# Patient Record
Sex: FEMALE | Race: WHITE | NOT HISPANIC OR LATINO | Employment: FULL TIME | URBAN - METROPOLITAN AREA
[De-identification: names, ages, dates, MRNs, and addresses within clinical notes are randomized per-mention and may not be internally consistent; named-entity substitution may affect disease eponyms.]

---

## 2019-01-07 ENCOUNTER — HOSPITAL ENCOUNTER (OUTPATIENT)
Dept: OTHER | Facility: HOSPITAL | Age: 29
Discharge: HOME | End: 2019-01-07
Attending: FAMILY MEDICINE | Admitting: FAMILY MEDICINE

## 2019-06-21 ENCOUNTER — OFFICE VISIT (OUTPATIENT)
Dept: FAMILY MEDICINE CLINIC | Facility: CLINIC | Age: 29
End: 2019-06-21

## 2019-06-21 VITALS
TEMPERATURE: 98 F | RESPIRATION RATE: 18 BRPM | WEIGHT: 203 LBS | DIASTOLIC BLOOD PRESSURE: 72 MMHG | BODY MASS INDEX: 30.77 KG/M2 | OXYGEN SATURATION: 99 % | SYSTOLIC BLOOD PRESSURE: 116 MMHG | HEIGHT: 68 IN | HEART RATE: 90 BPM

## 2019-06-21 DIAGNOSIS — M25.531 WRIST PAIN, ACUTE, RIGHT: Primary | ICD-10-CM

## 2019-06-21 DIAGNOSIS — E66.3 OVERWEIGHT: Chronic | ICD-10-CM

## 2019-06-21 PROBLEM — F32.A DEPRESSION: Status: ACTIVE | Noted: 2019-06-21

## 2019-06-21 PROBLEM — F41.9 ANXIETY: Status: ACTIVE | Noted: 2019-06-21

## 2019-06-21 PROBLEM — F60.3 BORDERLINE PERSONALITY DISORDER (HCC): Status: ACTIVE | Noted: 2019-06-21

## 2019-06-21 PROBLEM — D64.9 ANEMIA: Status: ACTIVE | Noted: 2019-06-21

## 2019-06-21 PROCEDURE — 99213 OFFICE O/P EST LOW 20 MIN: CPT | Performed by: FAMILY MEDICINE

## 2019-06-21 RX ORDER — ELECTROLYTES/DEXTROSE
1 SOLUTION, ORAL ORAL DAILY
COMMUNITY

## 2019-06-21 RX ORDER — ESCITALOPRAM OXALATE 10 MG/1
10 TABLET ORAL DAILY
Refills: 2 | COMMUNITY
Start: 2019-05-21 | End: 2021-04-06

## 2019-06-21 NOTE — PROGRESS NOTES
Subjective   Su Dolan is a 29 y.o. female.     Patient is here today with a bump on her right wrist. Patient states that she fell on the wrist in May resulting in no major injuries, only soreness. Soreness has worsened since the injury. Patient has been wearing a support brace off and on for 2 weeks without relief.       Cyst   This is a new problem. The current episode started more than 1 month ago. The problem occurs constantly. The problem has been gradually worsening. Associated symptoms include joint swelling. Pertinent negatives include no anorexia, arthralgias, change in bowel habit, fatigue, myalgias, rash, sore throat, vomiting or weakness. The symptoms are aggravated by exertion. She has tried immobilization for the symptoms. The treatment provided no relief.   Obesity   This is a chronic problem. The current episode started more than 1 year ago. The problem occurs constantly. The problem has been gradually worsening. Associated symptoms include joint swelling. Pertinent negatives include no anorexia, arthralgias, change in bowel habit, fatigue, myalgias, rash, sore throat, vomiting or weakness. Exacerbated by: unable to loose weight since childbirth a year ago. Treatments tried: She reports trying exercise (walking), taking a busier job, dieting, [id number] calorie diet, and becoming a vegetarian. The treatment provided no relief.        The following portions of the patient's history were reviewed and updated as appropriate: allergies, current medications, past family history, past medical history, past social history, past surgical history and problem list.    Review of Systems   Constitutional: Negative for appetite change (denies stress eating or excessive appetite), fatigue and unexpected weight loss.   HENT: Negative for sore throat.    Eyes: Negative for visual disturbance.   Gastrointestinal: Negative for anorexia, change in bowel habit, constipation and vomiting.   Musculoskeletal:  "Positive for joint swelling. Negative for arthralgias, gait problem and myalgias.   Skin: Positive for skin lesions. Negative for rash and bruise.   Allergic/Immunologic: Negative for food allergies.   Neurological: Negative for tremors, syncope and weakness.   Psychiatric/Behavioral: Negative for depressed mood.       Objective    Vitals:    06/21/19 1617   BP: 116/72   Pulse: 90   Resp: 18   Temp: 98 °F (36.7 °C)   TempSrc: Oral   SpO2: 99%   Weight: 92.1 kg (203 lb)   Height: 172.7 cm (68\")       Physical Exam   HENT:   Head: Normocephalic and atraumatic.   Mouth/Throat: Oropharynx is clear and moist.   Eyes: Conjunctivae and EOM are normal. Pupils are equal, round, and reactive to light.   Neck: Normal range of motion. Neck supple. No edema present.   Cardiovascular: Normal rate, regular rhythm and normal heart sounds.   Pulmonary/Chest: Effort normal and breath sounds normal.   Abdominal: Soft. Bowel sounds are normal.   Musculoskeletal:        Right wrist: She exhibits decreased range of motion, tenderness and swelling. She exhibits no crepitus and no deformity.        Arms:  Neurological: She is alert. No cranial nerve deficit.   Skin: No rash noted.   Psychiatric: She has a normal mood and affect. Thought content normal.         Assessment/Plan   Su was seen today for mass.    Diagnoses and all orders for this visit:    Wrist pain, acute, right  Comments:  Treat topically with diclofenac gel.  Proceed with x-ray.  Continue wrist compression/bracing.  Orders:  -     XR Wrist 3+ View Right; Future  -     diclofenac (VOLTAREN) 1 % gel gel; Apply 2 g topically to the appropriate area as directed 4 (Four) Times a Day As Needed (pain).    Overweight  Comments:  Dietary modification discussed.  Try low carb diet.  Continue calorie counting and exercise.                     "

## 2019-06-22 PROBLEM — M25.531 WRIST PAIN, ACUTE, RIGHT: Status: ACTIVE | Noted: 2019-06-22

## 2019-06-25 ENCOUNTER — HOSPITAL ENCOUNTER (OUTPATIENT)
Dept: GENERAL RADIOLOGY | Facility: HOSPITAL | Age: 29
Discharge: HOME | End: 2019-06-25
Admitting: FAMILY MEDICINE

## 2019-06-25 DIAGNOSIS — M25.531 WRIST PAIN, ACUTE, RIGHT: ICD-10-CM

## 2019-06-25 PROCEDURE — 73110 X-RAY EXAM OF WRIST: CPT

## 2019-06-28 ENCOUNTER — TELEPHONE (OUTPATIENT)
Dept: FAMILY MEDICINE CLINIC | Facility: CLINIC | Age: 29
End: 2019-06-28

## 2019-06-28 NOTE — TELEPHONE ENCOUNTER
Patient notified.  She has not started the voltaren gel.  She was advised to try this and let me know if not better in 1 week.  Plan for OT if not improvement.

## 2020-05-15 ENCOUNTER — OFFICE VISIT (OUTPATIENT)
Dept: FAMILY MEDICINE CLINIC | Facility: CLINIC | Age: 30
End: 2020-05-15

## 2020-05-15 ENCOUNTER — HOSPITAL ENCOUNTER (OUTPATIENT)
Dept: GENERAL RADIOLOGY | Facility: HOSPITAL | Age: 30
Discharge: HOME | End: 2020-05-15
Admitting: FAMILY MEDICINE

## 2020-05-15 VITALS
HEIGHT: 68 IN | RESPIRATION RATE: 18 BRPM | WEIGHT: 194 LBS | TEMPERATURE: 98 F | DIASTOLIC BLOOD PRESSURE: 78 MMHG | BODY MASS INDEX: 29.4 KG/M2 | SYSTOLIC BLOOD PRESSURE: 118 MMHG | HEART RATE: 115 BPM | OXYGEN SATURATION: 99 %

## 2020-05-15 DIAGNOSIS — H69.82 ETD (EUSTACHIAN TUBE DYSFUNCTION), LEFT: ICD-10-CM

## 2020-05-15 DIAGNOSIS — J35.1 ENLARGED TONSILS: ICD-10-CM

## 2020-05-15 DIAGNOSIS — M25.531 RIGHT WRIST PAIN: Primary | ICD-10-CM

## 2020-05-15 PROCEDURE — 99214 OFFICE O/P EST MOD 30 MIN: CPT | Performed by: FAMILY MEDICINE

## 2020-05-15 PROCEDURE — 73110 X-RAY EXAM OF WRIST: CPT

## 2020-05-15 RX ORDER — MELOXICAM 15 MG/1
15 TABLET ORAL DAILY
Qty: 30 TABLET | Refills: 5 | Status: SHIPPED | OUTPATIENT
Start: 2020-05-15 | End: 2021-04-06

## 2020-05-15 NOTE — PROGRESS NOTES
Subjective   Su Dolan is a 29 y.o. female.     Patient states she fell on her wrist last year and was told it was tendonitis but recently noticed bump on it and it's sore.   Pt c/o popping noise left ear. X 1 day. Has not been taking allergy meds.  Pt c/o enlarged tonsils. States she has been known to wake up gasping for air. States her  has said she has stopped breathing. Pt interested in being evaluated for possible tonsillectomy     Wrist Pain    The pain is present in the right wrist. This is a recurrent problem. The current episode started more than 1 month ago. The problem occurs constantly. Pertinent negatives include no fever or numbness. Treatments tried: splint. The treatment provided no relief.   Earache    There is pain in the left ear. This is a new problem. The current episode started yesterday. The problem occurs constantly. The problem has been waxing and waning. There has been no fever. Pertinent negatives include no abdominal pain, diarrhea, neck pain, rash or vomiting. She has tried nothing for the symptoms.      The following portions of the patient's history were reviewed and updated as appropriate: allergies, current medications, past family history, past medical history, past social history, past surgical history and problem list.    Patient Active Problem List   Diagnosis   • Anemia   • Anxiety   • Borderline personality disorder (CMS/HCC)   • Depression   • Overweight   • Wrist pain, acute, right       Current Outpatient Medications on File Prior to Visit   Medication Sig Dispense Refill   • Multiple Vitamins-Minerals (MULTIVITAMIN ADULT) tablet Take 1 tablet by mouth Daily.     • diclofenac (VOLTAREN) 1 % gel gel Apply 2 g topically to the appropriate area as directed 4 (Four) Times a Day As Needed (pain). 22 g 1   • escitalopram (LEXAPRO) 10 MG tablet Take 10 mg by mouth Daily.  2     No current facility-administered medications on file prior to visit.      Current  outpatient and discharge medications have been reconciled for the patient.  Reviewed by: Tomas Cabrera MD      Allergies   Allergen Reactions   • Latex Rash       Review of Systems   Constitutional: Negative for activity change, appetite change, fatigue and fever.   HENT: Negative for ear pain, swollen glands and voice change.    Eyes: Negative for visual disturbance.   Respiratory: Negative for shortness of breath and wheezing.    Cardiovascular: Negative for chest pain and leg swelling.   Gastrointestinal: Negative for abdominal pain, blood in stool, constipation, diarrhea, nausea and vomiting.   Endocrine: Negative for polydipsia and polyuria.   Genitourinary: Negative for dysuria, frequency and hematuria.   Musculoskeletal: Negative for joint swelling, neck pain and neck stiffness.   Skin: Negative for rash and bruise.   Neurological: Negative for weakness, numbness and headache.   Psychiatric/Behavioral: Negative for suicidal ideas and depressed mood.     I have reviewed and confirmed the accuracy of the ROS as documented by the MA/LPN/RN Tomas Cabrera MD      Objective   Vitals:    05/15/20 1437   BP: 118/78   Pulse: 115   Resp: 18   Temp: 98 °F (36.7 °C)   SpO2: 99%     Physical Exam   Constitutional: She is oriented to person, place, and time. She appears well-developed and well-nourished.   HENT:   Head: Normocephalic and atraumatic.   Left Ear: External ear normal.   Nose: Nose normal.   Mouth/Throat: Oropharynx is clear and moist. Tonsils are 3+ on the right. Tonsils are 3+ on the left. No tonsillar exudate.   Eyes: Pupils are equal, round, and reactive to light. EOM are normal.   Neck: Normal range of motion. Neck supple.   Cardiovascular: Normal rate, regular rhythm and normal heart sounds.   Pulmonary/Chest: Effort normal.   Abdominal: Soft. Bowel sounds are normal.   Musculoskeletal: Normal range of motion.   Tender at bony prominence right rist radial aspect distal radius. No  numbness, tingling, or evidence for neurovascular compromise.    Neurological: She is alert and oriented to person, place, and time.   Skin: Skin is warm and dry.   Psychiatric: She has a normal mood and affect. Her behavior is normal. Judgment and thought content normal.       I wore protective equipment throughout this patient encounter to include mask. Hand hygiene was performed before donning protective equipment and after removal when leaving the room.    Assessment/Plan .  Problem List Items Addressed This Visit     None      Visit Diagnoses     Right wrist pain    -  Primary    Relevant Medications    meloxicam (Mobic) 15 MG tablet    Other Relevant Orders    XR Wrist 3+ View Right (Completed)    Enlarged tonsils        with witnessed apnea. [facility] ent eval once pandemic less.  Try claritin      ETD (Eustachian tube dysfunction), left        try claritin        Findings discussed. All questions answered.  Medication and medication adverse effects discussed.  Drug education given and explained to patient. Patient verbalized understanding.  Follow-up for routine health maintenance as directed  Follow-up after testing complete, sooner for worsening symptoms or any concerns

## 2020-05-18 ENCOUNTER — TELEPHONE (OUTPATIENT)
Dept: FAMILY MEDICINE CLINIC | Facility: CLINIC | Age: 30
End: 2020-05-18

## 2020-05-18 NOTE — TELEPHONE ENCOUNTER
----- Message from Tomas Cabrera MD sent at 5/15/2020  5:24 PM EDT -----  Please notify patient that  Xray was normal

## 2020-12-22 ENCOUNTER — TELEPHONE (OUTPATIENT)
Dept: FAMILY MEDICINE CLINIC | Facility: CLINIC | Age: 30
End: 2020-12-22

## 2021-04-06 ENCOUNTER — OFFICE VISIT (OUTPATIENT)
Dept: FAMILY MEDICINE CLINIC | Facility: CLINIC | Age: 31
End: 2021-04-06

## 2021-04-06 VITALS
OXYGEN SATURATION: 96 % | SYSTOLIC BLOOD PRESSURE: 102 MMHG | HEART RATE: 85 BPM | DIASTOLIC BLOOD PRESSURE: 68 MMHG | HEIGHT: 68 IN | RESPIRATION RATE: 18 BRPM | WEIGHT: 191 LBS | TEMPERATURE: 98.7 F | BODY MASS INDEX: 28.95 KG/M2

## 2021-04-06 DIAGNOSIS — Z63.79 STRESSFUL LIFE EVENTS AFFECTING FAMILY AND HOUSEHOLD: Primary | ICD-10-CM

## 2021-04-06 DIAGNOSIS — F32.A DEPRESSION, UNSPECIFIED DEPRESSION TYPE: ICD-10-CM

## 2021-04-06 DIAGNOSIS — E66.3 OVERWEIGHT: ICD-10-CM

## 2021-04-06 DIAGNOSIS — F41.9 ANXIETY: ICD-10-CM

## 2021-04-06 PROCEDURE — 99214 OFFICE O/P EST MOD 30 MIN: CPT | Performed by: FAMILY MEDICINE

## 2021-04-06 RX ORDER — BUPROPION HYDROCHLORIDE 150 MG/1
150 TABLET ORAL DAILY
Qty: 30 TABLET | Refills: 12 | Status: SHIPPED | OUTPATIENT
Start: 2021-04-06 | End: 2022-06-15 | Stop reason: SDUPTHER

## 2021-04-06 NOTE — PROGRESS NOTES
Subjective   Su Dolan is a 30 y.o. female.     Su states she is going thru a divorce at this time and is having a lot of anxiety and depression      Anxiety  Presents for initial visit. Onset was at an unknown time. The problem has been rapidly worsening. Symptoms include decreased concentration, depressed mood, excessive worry, insomnia, irritability, nervous/anxious behavior and restlessness. Patient reports no chest pain, muscle tension, nausea or suicidal ideas. Symptoms occur constantly. The symptoms are aggravated by family issues. The quality of sleep is fair. Nighttime awakenings: several.     Her past medical history is significant for depression.   Depression  Visit Type: initial  Onset of symptoms: at an unknown time  Progression since onset: rapidly worsening  Patient presents with the following symptoms: decreased concentration, depressed mood, excessive worry, insomnia, irritability, nervousness/anxiety and restlessness.  Patient is not experiencing: muscle tension and suicidal ideas.         The following portions of the patient's history were reviewed and updated as appropriate: allergies, current medications, past family history, past medical history, past social history, past surgical history and problem list.    Patient Active Problem List   Diagnosis   • Anemia   • Anxiety   • Borderline personality disorder (CMS/HCC)   • Depression   • Overweight   • Wrist pain, acute, right       Current Outpatient Medications on File Prior to Visit   Medication Sig Dispense Refill   • Multiple Vitamins-Minerals (MULTIVITAMIN ADULT) tablet Take 1 tablet by mouth Daily.     • [DISCONTINUED] diclofenac (VOLTAREN) 1 % gel gel Apply 2 g topically to the appropriate area as directed 4 (Four) Times a Day As Needed (pain). 22 g 1   • [DISCONTINUED] escitalopram (LEXAPRO) 10 MG tablet Take 10 mg by mouth Daily.  2   • [DISCONTINUED] meloxicam (Mobic) 15 MG tablet Take 1 tablet by mouth Daily. 30 tablet  5     No current facility-administered medications on file prior to visit.     Current outpatient and discharge medications have been reconciled for the patient.  Reviewed by: Tomas Cabrera MD      Allergies   Allergen Reactions   • Latex Rash       Review of Systems   Constitutional: Positive for irritability.   Cardiovascular: Negative for chest pain.   Gastrointestinal: Negative for nausea.   Psychiatric/Behavioral: Positive for decreased concentration and depressed mood. Negative for suicidal ideas. The patient is nervous/anxious and has insomnia.      I have reviewed and confirmed the accuracy of the ROS as documented by the MA/LPN/RN Tomas Cabrera MD    Objective   Vitals:    04/06/21 1054   BP: 102/68   Pulse: 85   Resp: 18   Temp: 98.7 °F (37.1 °C)   SpO2: 96%     Physical Exam  Constitutional:       Appearance: She is well-developed.   HENT:      Head: Normocephalic and atraumatic.      Right Ear: External ear normal.      Left Ear: External ear normal.      Nose: Nose normal.   Eyes:      Pupils: Pupils are equal, round, and reactive to light.   Cardiovascular:      Rate and Rhythm: Normal rate and regular rhythm.      Heart sounds: Normal heart sounds.   Pulmonary:      Effort: Pulmonary effort is normal.      Breath sounds: Normal breath sounds.   Abdominal:      General: Bowel sounds are normal.      Palpations: Abdomen is soft.   Musculoskeletal:         General: Normal range of motion.      Cervical back: Normal range of motion and neck supple.   Skin:     General: Skin is warm and dry.   Neurological:      Mental Status: She is alert and oriented to person, place, and time.   Psychiatric:         Behavior: Behavior normal.         Thought Content: Thought content normal.         Judgment: Judgment normal.             Assessment/Plan .  Problem List Items Addressed This Visit     Anxiety    Relevant Medications    buPROPion XL (Wellbutrin XL) 150 MG 24 hr tablet    Depression     Overview     Failed lexapro, prozac, zoloft, klonopin.         Relevant Medications    buPROPion XL (Wellbutrin XL) 150 MG 24 hr tablet    Overweight      Other Visit Diagnoses     Stressful life events affecting family and household    -  Primary    Relevant Medications    buPROPion XL (Wellbutrin XL) 150 MG 24 hr tablet       Findings discussed. All questions answered.  Medication and medication adverse effects discussed.  Drug education given and explained to patient. Patient verbalized understanding.  Follow up in 1 month for reevaluation, sooner for concerns.      I wore protective equipment throughout this patient encounter to include mask and eye protection. Hand hygiene was performed before donning protective equipment and after removal when leaving the room

## 2021-11-03 ENCOUNTER — TELEPHONE (OUTPATIENT)
Dept: FAMILY MEDICINE CLINIC | Facility: CLINIC | Age: 31
End: 2021-11-03

## 2021-11-03 NOTE — TELEPHONE ENCOUNTER
Caller: Su Dolan     Relationship to Patient: SELF    Phone Number: [phone]     Reason for Call: PATIENT CALLED SAYING THE NEW PLACE SHE IS MOVING TO WON'T ALLOW ANIMALS AND SHE WANTED TO SEE IF DR. POOLE WAS ABLE TO WRITE ANYTHING SAYING THAT HER CAT IS AN EMOTIONAL SUPPORT ANIMAL. SHE SAID SHE HAS BEEN SEEN BY HIM REGARDING HER DEPRESSION AND SHE REALLY NEEDS HER CAT WITH HER.

## 2021-12-28 ENCOUNTER — TELEPHONE (OUTPATIENT)
Dept: FAMILY MEDICINE CLINIC | Facility: CLINIC | Age: 31
End: 2021-12-28

## 2021-12-28 NOTE — TELEPHONE ENCOUNTER
Caller: Su Dolan    Relationship to patient: Self    Best call back number: [phone]    Date of positive COVID19 test: 12/25    COVID19 symptoms: FATIGUED, PAIN IN LOWER CHEST AREA-LOWER THEN HER HEART AREA    Date of initial quarantine: SYMPTOMS STARTED 12/23    Additional information or concerns: PLEASE ADVISE IF THE PAIN IN HER LOWER CHEST AREA IS NORMAL FOR COVID SYMPTOMS AND WHAT PATIENT CAN DO TO RELIEVE SYMPTOMS.    What is the patients preferred pharmacy: [id number] AT [facility] OF  &  [address] - [phone]  - [phone] FX

## 2021-12-28 NOTE — TELEPHONE ENCOUNTER
I cannot make any determinations given those sx, she may wish to be seen in urgent care if she is having those kinds of sx

## 2022-06-15 DIAGNOSIS — F32.A DEPRESSION, UNSPECIFIED DEPRESSION TYPE: ICD-10-CM

## 2022-06-15 DIAGNOSIS — Z63.79 STRESSFUL LIFE EVENTS AFFECTING FAMILY AND HOUSEHOLD: ICD-10-CM

## 2022-06-15 DIAGNOSIS — F41.9 ANXIETY: ICD-10-CM

## 2022-06-15 NOTE — TELEPHONE ENCOUNTER
Caller: Su Dolan    Relationship: Self    Best call back number: [phone]    Requested Prescriptions:   Requested Prescriptions     Pending Prescriptions Disp Refills   • buPROPion XL (Wellbutrin XL) 150 MG 24 hr tablet 30 tablet 12     Sig: Take 1 tablet by mouth Daily.        Pharmacy where request [facility] be sent[id number] - [place] [facility] [address] AT [facility] OF  & [facility] - [phone] [place] [phone] FX     Additional details provided by patient: PATIENT DOES NOT HAVE INSURANCE RIGHT NOW.  SHE WOULD LIKE TO KNOW IF THERE ARE ANY PROGRAMS TO HELP WITH THE MEDICATION.  PLEASE CALL AND ADVISE.     Does the patient have less than a 3 day supply:  [x] Yes  [] No    Harleen Rizvi Rep   06/15/22 11:58 EDT

## 2022-06-16 RX ORDER — BUPROPION HYDROCHLORIDE 150 MG/1
150 TABLET ORAL DAILY
Qty: 30 TABLET | Refills: 0 | Status: SHIPPED | OUTPATIENT
Start: 2022-06-16 | End: 2022-07-25

## 2022-07-22 DIAGNOSIS — F41.9 ANXIETY: ICD-10-CM

## 2022-07-22 DIAGNOSIS — Z63.79 STRESSFUL LIFE EVENTS AFFECTING FAMILY AND HOUSEHOLD: ICD-10-CM

## 2022-07-22 DIAGNOSIS — F32.A DEPRESSION, UNSPECIFIED DEPRESSION TYPE: ICD-10-CM

## 2022-07-25 RX ORDER — BUPROPION HYDROCHLORIDE 150 MG/1
150 TABLET ORAL DAILY
Qty: 30 TABLET | Refills: 0 | Status: SHIPPED | OUTPATIENT
Start: 2022-07-25

## 2022-08-28 DIAGNOSIS — F41.9 ANXIETY: ICD-10-CM

## 2022-08-28 DIAGNOSIS — Z63.79 STRESSFUL LIFE EVENTS AFFECTING FAMILY AND HOUSEHOLD: ICD-10-CM

## 2022-08-28 DIAGNOSIS — F32.A DEPRESSION, UNSPECIFIED DEPRESSION TYPE: ICD-10-CM

## 2022-08-29 RX ORDER — BUPROPION HYDROCHLORIDE 150 MG/1
150 TABLET ORAL DAILY
Qty: 30 TABLET | Refills: 0 | OUTPATIENT
Start: 2022-08-29

## 2022-10-06 DIAGNOSIS — F32.A DEPRESSION, UNSPECIFIED DEPRESSION TYPE: ICD-10-CM

## 2022-10-06 DIAGNOSIS — F41.9 ANXIETY: ICD-10-CM

## 2022-10-06 DIAGNOSIS — Z63.79 STRESSFUL LIFE EVENTS AFFECTING FAMILY AND HOUSEHOLD: ICD-10-CM

## 2022-10-06 RX ORDER — BUPROPION HYDROCHLORIDE 150 MG/1
150 TABLET ORAL DAILY
Qty: 30 TABLET | Refills: 0 | OUTPATIENT
Start: 2022-10-06

## 2022-10-06 NOTE — TELEPHONE ENCOUNTER
Caller: Su Dolan    Relationship: Self    Best call back number: [phone]  Requested Prescriptions:   Requested Prescriptions     Pending Prescriptions Disp Refills   • buPROPion XL (WELLBUTRIN XL) 150 MG 24 hr tablet 30 tablet 0     Sig: Take 1 tablet by mouth Daily.        Pharmacy where request should be sent: [id number] - [place]  [address] [place] [address] AT [facility] OF  [address] & [facility] - [phone] [facility] [phone] FX     Additional details provided by patient: SHE HAS AN APPOINTMENT ON 10/21/22  CAN YOU GIVE HER ENOUGH PILLS TILL THE APPOINTMENT?    Does the patient have less than a 3 day supply:  [x] Yes  [] No    Rajesh Campbell   10/06/22 16:37 EDT

## 2023-04-06 NOTE — PROGRESS NOTES
Subjective   Su Dolan is a 32 y.o. female.   Chief Complaint   Patient presents with   • Anxiety   • Annual Exam       History of Present Illness  Follow up anxiety  Has not been in due to insurance  Started on wellbutrin 12h recently, but having anxiety attacks while at work. Taking it qd vs bid    Anxiety  Presents for initial visit. Onset was 1 to 5 years ago. The problem has been gradually worsening. Symptoms include excessive worry, nervous/anxious behavior, panic and restlessness.     Treatments tried: wellbutrin 150 mg. The treatment provided no relief. Compliance with prior treatments has been good.        The following portions of the patient's history were reviewed and updated as appropriate: allergies, current medications, past family history, past medical history, past social history, past surgical history and problem list.    Patient Active Problem List   Diagnosis   • Anemia   • Anxiety   • Borderline personality disorder   • Depression   • Overweight   • Former tobacco use       Current Outpatient Medications on File Prior to Visit   Medication Sig Dispense Refill   • Multiple Vitamins-Minerals (MULTIVITAMIN ADULT) tablet Take 1 tablet by mouth Daily.     • [DISCONTINUED] buPROPion XL (WELLBUTRIN XL) 150 MG 24 hr tablet TAKE 1 TABLET BY MOUTH DAILY 30 tablet 0   • [DISCONTINUED] buPROPion SR (WELLBUTRIN SR) 150 MG 12 hr tablet Take 1 tablet by mouth Daily. (Patient not taking: Reported on 4/11/2023)       No current facility-administered medications on file prior to visit.     Current outpatient and discharge medications have been reconciled for the patient.  Reviewed by: Tomas Cabrera MD      Allergies   Allergen Reactions   • Latex Rash       Review of Systems   Psychiatric/Behavioral: The patient is nervous/anxious.    All other systems reviewed and are negative.    I have reviewed and confirmed the accuracy of the ROS as documented by the MA/LPN/RN Tomas Cabrera,  "MD    Objective   Visit Vitals  /76 (BP Location: Right arm, Patient Position: Sitting, Cuff Size: Adult)   Pulse 89   Temp 97.7 °F (36.5 °C)   Resp 18   Ht 172.7 cm (68\")   Wt 77.7 kg (171 lb 6.4 oz)   SpO2 99%   BMI 26.06 kg/m²      **  Physical Exam  Vitals reviewed.   Constitutional:       Appearance: She is well-developed.   HENT:      Head: Normocephalic.      Right Ear: External ear normal.      Left Ear: External ear normal.      Nose: Nose normal.   Eyes:      Conjunctiva/sclera: Conjunctivae normal.   Cardiovascular:      Rate and Rhythm: Normal rate.   Pulmonary:      Effort: Pulmonary effort is normal. No respiratory distress.   Abdominal:      General: Abdomen is flat.   Musculoskeletal:         General: No signs of injury.      Cervical back: Normal range of motion.   Skin:     Findings: No rash.   Neurological:      Mental Status: She is alert and oriented to person, place, and time.   Psychiatric:         Mood and Affect: Mood is anxious.         Behavior: Behavior normal.         Thought Content: Thought content normal.         Judgment: Judgment normal.       Derm Physical Exam    Diagnoses and all orders for this visit:    1. Anxiety (Primary)  -     buPROPion XL (WELLBUTRIN XL) 150 MG 24 hr tablet; Take 1 tablet by mouth Daily.  Dispense: 90 tablet; Refill: 3  -     doxepin (SINEquan) 25 MG capsule; Take 2 capsules by mouth Daily As Needed (anxiety).  Dispense: 30 capsule; Refill: 0    2. Former tobacco use    3. Depression, unspecified depression type  Overview:  Failed lexapro, prozac, zoloft, klonopin.    Orders:  -     buPROPion XL (WELLBUTRIN XL) 150 MG 24 hr tablet; Take 1 tablet by mouth Daily.  Dispense: 90 tablet; Refill: 3    4. Stressful life events affecting family and household  -     buPROPion XL (WELLBUTRIN XL) 150 MG 24 hr tablet; Take 1 tablet by mouth Daily.  Dispense: 90 tablet; Refill: 3   Restart wellbutrin 24h  Add doxepin qd prn anxiety  Follow-up in 4-6 weeks if not " better.  Follow-up sooner for worsening symptoms or for any concerns.    Follow-up for routine health maintenance as indicated.  Recommended follow-up for full physical examination and routine health maintanence.        Expected course, medications, and adverse effects discussed as appropriate.  Call or return if worsening or persistent symptoms.     This document is intended for medical professional use only.

## 2023-04-11 ENCOUNTER — OFFICE VISIT (OUTPATIENT)
Dept: FAMILY MEDICINE CLINIC | Facility: CLINIC | Age: 33
End: 2023-04-11
Payer: COMMERCIAL

## 2023-04-11 VITALS
DIASTOLIC BLOOD PRESSURE: 76 MMHG | SYSTOLIC BLOOD PRESSURE: 114 MMHG | WEIGHT: 171.4 LBS | BODY MASS INDEX: 25.98 KG/M2 | HEIGHT: 68 IN | RESPIRATION RATE: 18 BRPM | OXYGEN SATURATION: 99 % | TEMPERATURE: 97.7 F | HEART RATE: 89 BPM

## 2023-04-11 DIAGNOSIS — F41.9 ANXIETY: Primary | ICD-10-CM

## 2023-04-11 DIAGNOSIS — Z87.891 FORMER TOBACCO USE: ICD-10-CM

## 2023-04-11 DIAGNOSIS — F32.A DEPRESSION, UNSPECIFIED DEPRESSION TYPE: ICD-10-CM

## 2023-04-11 DIAGNOSIS — Z63.79 STRESSFUL LIFE EVENTS AFFECTING FAMILY AND HOUSEHOLD: ICD-10-CM

## 2023-04-11 PROCEDURE — 99214 OFFICE O/P EST MOD 30 MIN: CPT | Performed by: FAMILY MEDICINE

## 2023-04-11 RX ORDER — BUPROPION HYDROCHLORIDE 150 MG/1
1 TABLET, EXTENDED RELEASE ORAL DAILY
COMMUNITY
Start: 2023-02-04 | End: 2023-04-11

## 2023-04-11 RX ORDER — BUPROPION HYDROCHLORIDE 150 MG/1
150 TABLET ORAL DAILY
Qty: 90 TABLET | Refills: 3 | Status: SHIPPED | OUTPATIENT
Start: 2023-04-11

## 2023-04-11 RX ORDER — DOXEPIN HYDROCHLORIDE 25 MG/1
50 CAPSULE ORAL DAILY PRN
Qty: 30 CAPSULE | Refills: 0 | Status: SHIPPED | OUTPATIENT
Start: 2023-04-11

## 2023-09-14 ENCOUNTER — OFFICE VISIT (OUTPATIENT)
Dept: FAMILY MEDICINE CLINIC | Facility: CLINIC | Age: 33
End: 2023-09-14
Payer: COMMERCIAL

## 2023-09-14 VITALS
OXYGEN SATURATION: 98 % | DIASTOLIC BLOOD PRESSURE: 82 MMHG | WEIGHT: 167.8 LBS | RESPIRATION RATE: 18 BRPM | HEART RATE: 87 BPM | BODY MASS INDEX: 25.43 KG/M2 | HEIGHT: 68 IN | TEMPERATURE: 98.4 F | SYSTOLIC BLOOD PRESSURE: 122 MMHG

## 2023-09-14 DIAGNOSIS — J40 BRONCHITIS: Primary | ICD-10-CM

## 2023-09-14 DIAGNOSIS — E66.3 OVERWEIGHT WITH BODY MASS INDEX (BMI) OF 25 TO 25.9 IN ADULT: ICD-10-CM

## 2023-09-14 DIAGNOSIS — Z87.891 FORMER TOBACCO USE: ICD-10-CM

## 2023-09-14 PROCEDURE — 99213 OFFICE O/P EST LOW 20 MIN: CPT | Performed by: FAMILY MEDICINE

## 2023-09-14 RX ORDER — MONTELUKAST SODIUM 10 MG/1
10 TABLET ORAL NIGHTLY
Qty: 30 TABLET | Refills: 5 | Status: SHIPPED | OUTPATIENT
Start: 2023-09-14

## 2023-09-14 RX ORDER — NORETHINDRONE ACETATE/ETHINYL ESTRADIOL AND FERROUS FUMARATE 1MG-20(21)
1 KIT ORAL DAILY
COMMUNITY
Start: 2023-07-06

## 2023-09-14 RX ORDER — AZITHROMYCIN 250 MG/1
TABLET, FILM COATED ORAL
Qty: 6 TABLET | Refills: 0 | Status: SHIPPED | OUTPATIENT
Start: 2023-09-14

## 2023-09-14 RX ORDER — ALBUTEROL SULFATE 90 UG/1
2 AEROSOL, METERED RESPIRATORY (INHALATION) EVERY 4 HOURS PRN
Qty: 6.7 G | Refills: 1 | Status: SHIPPED | OUTPATIENT
Start: 2023-09-14

## 2023-09-14 NOTE — PROGRESS NOTES
Subjective   Su Dolan is a 33 y.o. female.   Chief Complaint   Patient presents with    Cough       History of Present Illness  Had covid approx 2 weeks ago. Also some headaches   Cough  This is a recurrent problem. The current episode started 1 to 4 weeks ago. The problem has been unchanged. The problem occurs constantly. The cough is Non-productive. Associated symptoms include chest pain, shortness of breath and wheezing. Pertinent negatives include no ear pain, fever or rash. Nothing aggravates the symptoms. She has tried oral steroids for the symptoms. The treatment provided no relief.      The following portions of the patient's history were reviewed and updated as appropriate: allergies, current medications, past family history, past medical history, past social history, past surgical history, and problem list.    Patient Active Problem List   Diagnosis    Anemia    Anxiety    Borderline personality disorder    Depression    Overweight with body mass index (BMI) of 25 to 25.9 in adult    Former tobacco use       Current Outpatient Medications on File Prior to Visit   Medication Sig Dispense Refill    Araceli Fe 1/20 1-20 MG-MCG per tablet Take 1 tablet by mouth Daily.      Multiple Vitamins-Minerals (MULTIVITAMIN ADULT) tablet Take 1 tablet by mouth Daily.      buPROPion XL (WELLBUTRIN XL) 150 MG 24 hr tablet Take 1 tablet by mouth Daily. (Patient not taking: Reported on 9/14/2023) 90 tablet 3    doxepin (SINEquan) 25 MG capsule Take 2 capsules by mouth Daily As Needed (anxiety). (Patient not taking: Reported on 9/14/2023) 30 capsule 0     No current facility-administered medications on file prior to visit.     Current outpatient and discharge medications have been reconciled for the patient.  Reviewed by: Tomas Cabrera MD      Allergies   Allergen Reactions    Latex Rash       Review of Systems   Constitutional:  Negative for activity change, appetite change, fatigue and fever.   HENT:   "Negative for ear pain, swollen glands and voice change.    Eyes:  Negative for visual disturbance.   Respiratory:  Positive for cough, shortness of breath and wheezing.    Cardiovascular:  Positive for chest pain. Negative for leg swelling.   Gastrointestinal:  Negative for abdominal pain, blood in stool, constipation, diarrhea, nausea and vomiting.   Endocrine: Negative for polydipsia and polyuria.   Genitourinary:  Negative for dysuria, frequency and hematuria.   Musculoskeletal:  Negative for joint swelling, neck pain and neck stiffness.   Skin:  Negative for rash and wound.   Neurological:  Negative for weakness, numbness and headache.   Psychiatric/Behavioral:  Negative for suicidal ideas and depressed mood.    I have reviewed and confirmed the accuracy of the ROS as documented by the MA/LPN/RN Tomas Cabrera MD    Objective   Visit Vitals  /82 (BP Location: Right arm, Patient Position: Sitting, Cuff Size: Adult)   Pulse 87   Temp 98.4 °F (36.9 °C) (Temporal)   Resp 18   Ht 172.7 cm (68\")   Wt 76.1 kg (167 lb 12.8 oz)   LMP 08/21/2023 (Approximate)   SpO2 98% Comment: room air   BMI 25.51 kg/m²      **  Physical Exam  Constitutional:       Appearance: She is well-developed.   HENT:      Head: Normocephalic and atraumatic.      Right Ear: External ear normal.      Left Ear: External ear normal.      Nose: Nose normal.   Eyes:      Pupils: Pupils are equal, round, and reactive to light.   Cardiovascular:      Rate and Rhythm: Normal rate and regular rhythm.      Heart sounds: Normal heart sounds.   Pulmonary:      Effort: Pulmonary effort is normal.      Breath sounds: Normal breath sounds.   Abdominal:      General: Bowel sounds are normal.      Palpations: Abdomen is soft.   Musculoskeletal:         General: Normal range of motion.      Cervical back: Normal range of motion and neck supple.   Skin:     General: Skin is warm and dry.   Neurological:      Mental Status: She is alert and oriented to " person, place, and time.   Psychiatric:         Behavior: Behavior normal.         Thought Content: Thought content normal.         Judgment: Judgment normal.     Derm Physical Exam    Diagnoses and all orders for this visit:    1. Bronchitis (Primary)  -     azithromycin (ZITHROMAX) 250 MG tablet; Take 2 tablets the first day, then 1 tablet daily for 4 days.  Dispense: 6 tablet; Refill: 0  -     montelukast (Singulair) 10 MG tablet; Take 1 tablet by mouth Every Night.  Dispense: 30 tablet; Refill: 5  -     albuterol sulfate  (90 Base) MCG/ACT inhaler; Inhale 2 puffs Every 4 (Four) Hours As Needed for Shortness of Air.  Dispense: 6.7 g; Refill: 1  -     Fluticasone-Umeclidin-Vilant (TRELEGY) 100-62.5-25 MCG/ACT inhaler; Inhale 1 puff Daily for 30 days.  Dispense: 1 each; Refill: 0    2. Former tobacco use    3. Overweight with body mass index (BMI) of 25 to 25.9 in adult  Assessment & Plan:  Patient's (Body mass index is 25.51 kg/m².) indicates that they are overweight with health conditions that include none . Weight is unchanged. BMI is is above average; BMI management plan is completed. We discussed portion control and increasing exercise.        Findings discussed. All questions answered.  Medication and medication adverse effects discussed.  Drug education given and explained to patient. Patient verbalized understanding.  Follow-up in 2 weeks if not better.  Follow-up sooner for worsening symptoms or for any concerns.   CXR if not improved  BMI is >= 25 and <30. (Overweight) The following options were offered after discussion;: weight loss educational material (shared in after visit summary)      Expected course, medications, and adverse effects discussed as appropriate.  Call or return if worsening or persistent symptoms.     This document is intended for medical professional use only.

## 2023-09-14 NOTE — ASSESSMENT & PLAN NOTE
Patient's (Body mass index is 25.51 kg/m².) indicates that they are overweight with health conditions that include none . Weight is unchanged. BMI is is above average; BMI management plan is completed. We discussed portion control and increasing exercise.

## 2023-12-01 ENCOUNTER — OFFICE VISIT (OUTPATIENT)
Dept: FAMILY MEDICINE CLINIC | Facility: CLINIC | Age: 33
End: 2023-12-01
Payer: COMMERCIAL

## 2023-12-01 VITALS
HEIGHT: 68 IN | SYSTOLIC BLOOD PRESSURE: 116 MMHG | DIASTOLIC BLOOD PRESSURE: 70 MMHG | WEIGHT: 165.6 LBS | BODY MASS INDEX: 25.1 KG/M2 | HEART RATE: 91 BPM | OXYGEN SATURATION: 98 % | RESPIRATION RATE: 20 BRPM

## 2023-12-01 DIAGNOSIS — E66.3 OVERWEIGHT WITH BODY MASS INDEX (BMI) OF 25 TO 25.9 IN ADULT: ICD-10-CM

## 2023-12-01 DIAGNOSIS — F41.9 ANXIETY: Primary | ICD-10-CM

## 2023-12-01 DIAGNOSIS — R53.83 OTHER FATIGUE: ICD-10-CM

## 2023-12-01 DIAGNOSIS — Z87.891 FORMER TOBACCO USE: ICD-10-CM

## 2023-12-01 PROCEDURE — 99214 OFFICE O/P EST MOD 30 MIN: CPT | Performed by: FAMILY MEDICINE

## 2023-12-01 RX ORDER — DOXEPIN HYDROCHLORIDE 3 MG/1
3 TABLET ORAL 3 TIMES DAILY PRN
Qty: 90 TABLET | Refills: 0 | Status: SHIPPED | OUTPATIENT
Start: 2023-12-01

## 2023-12-01 NOTE — PROGRESS NOTES
Subjective   Su Dolan is a 33 y.o. female.   Chief Complaint   Patient presents with    Anxiety       History of Present Illness  Ongoing anxiety. Failed many SSRIs per pt report. Wellbutrin helping for depression but less so for anxiety. Doxepin 25 mg qd caused 'crash' when wearing off. Failed klonopin/xanax as well     Also ongoing fatigue, cold intolerance       The following portions of the patient's history were reviewed and updated as appropriate: allergies, current medications, past family history, past medical history, past social history, past surgical history, and problem list.    Patient Active Problem List   Diagnosis    Anemia    Anxiety    Borderline personality disorder    Depression    Overweight with body mass index (BMI) of 25 to 25.9 in adult    Former tobacco use       Current Outpatient Medications on File Prior to Visit   Medication Sig Dispense Refill    albuterol sulfate  (90 Base) MCG/ACT inhaler Inhale 2 puffs Every 4 (Four) Hours As Needed for Shortness of Air. 6.7 g 1    buPROPion XL (WELLBUTRIN XL) 150 MG 24 hr tablet Take 1 tablet by mouth Daily. 90 tablet 3    Araceli Fe 1/20 1-20 MG-MCG per tablet Take 1 tablet by mouth Daily.      montelukast (Singulair) 10 MG tablet Take 1 tablet by mouth Every Night. 30 tablet 5    Multiple Vitamins-Minerals (MULTIVITAMIN ADULT) tablet Take 1 tablet by mouth Daily.      [DISCONTINUED] azithromycin (ZITHROMAX) 250 MG tablet Take 2 tablets the first day, then 1 tablet daily for 4 days. (Patient not taking: Reported on 12/1/2023) 6 tablet 0    [DISCONTINUED] doxepin (SINEquan) 25 MG capsule Take 2 capsules by mouth Daily As Needed (anxiety). (Patient not taking: Reported on 12/1/2023) 30 capsule 0     No current facility-administered medications on file prior to visit.     Current outpatient and discharge medications have been reconciled for the patient.  Reviewed by: Tomas Cabrera MD      Allergies   Allergen Reactions     "Latex Rash       Review of Systems   Constitutional:  Positive for fatigue. Negative for activity change, appetite change and fever.   HENT:  Negative for ear pain, swollen glands and voice change.    Eyes:  Negative for visual disturbance.   Respiratory:  Negative for shortness of breath and wheezing.    Cardiovascular:  Negative for chest pain and leg swelling.   Gastrointestinal:  Negative for abdominal pain, blood in stool, constipation, diarrhea, nausea and vomiting.   Endocrine: Negative for polydipsia and polyuria.   Genitourinary:  Negative for dysuria, frequency and hematuria.   Musculoskeletal:  Negative for joint swelling, neck pain and neck stiffness.   Skin:  Negative for rash and wound.   Neurological:  Negative for weakness, numbness and headache.   Psychiatric/Behavioral:  Negative for suicidal ideas and depressed mood. The patient is nervous/anxious.      I have reviewed and confirmed the accuracy of the ROS as documented by the MA/LPN/RN Tomas Cabrera MD    Objective   Visit Vitals  /70 (BP Location: Right arm, Patient Position: Sitting, Cuff Size: Large Adult)   Pulse 91   Resp 20   Ht 172.7 cm (67.99\")   Wt 75.1 kg (165 lb 9.6 oz)   LMP 12/01/2023   SpO2 98%   BMI 25.19 kg/m²      **  Physical Exam  Constitutional:       Appearance: She is well-developed.   HENT:      Head: Normocephalic and atraumatic.      Right Ear: External ear normal.      Left Ear: External ear normal.      Nose: Nose normal.   Eyes:      Pupils: Pupils are equal, round, and reactive to light.   Cardiovascular:      Rate and Rhythm: Normal rate and regular rhythm.      Heart sounds: Normal heart sounds.   Pulmonary:      Effort: Pulmonary effort is normal.      Breath sounds: Normal breath sounds.   Abdominal:      General: Bowel sounds are normal.      Palpations: Abdomen is soft.   Musculoskeletal:         General: Normal range of motion.      Cervical back: Normal range of motion and neck supple.   Skin:   "   General: Skin is warm and dry.   Neurological:      Mental Status: She is alert and oriented to person, place, and time.   Psychiatric:         Behavior: Behavior normal.         Thought Content: Thought content normal.         Judgment: Judgment normal.       Derm Physical Exam  Ortho Exam   Neurologic Exam     Mental Status   Oriented to person, place, and time.     Cranial Nerves     CN III, IV, VI   Pupils are equal, round, and reactive to light.       Diagnoses and all orders for this visit:    1. Anxiety (Primary)  -     Doxepin HCl 3 MG tablet; Take 3 mg by mouth 3 (Three) Times a Day As Needed (anxiety).  Dispense: 90 tablet; Refill: 0    2. Overweight with body mass index (BMI) of 25 to 25.9 in adult    3. Former tobacco use    4. Other fatigue  -     CBC & Differential  -     Comprehensive Metabolic Panel  -     TSH  -     Sedimentation Rate  -     T4, Free  -     T3         Try doxepin 3 mg tid prn  Check baseline labs  Continue wellbutrin, consider increasing to 300 mg if not improved. Consider latuda or abilify.   Follow up in 1 month for reevaluation, sooner for concerns.        Expected course, medications, and adverse effects discussed as appropriate.  Call or return if worsening or persistent symptoms.     This document is intended for medical professional use only.

## 2023-12-04 ENCOUNTER — TELEPHONE (OUTPATIENT)
Dept: FAMILY MEDICINE CLINIC | Facility: CLINIC | Age: 33
End: 2023-12-04
Payer: COMMERCIAL

## 2023-12-04 NOTE — TELEPHONE ENCOUNTER
Received fax from [facility] requesting PA  for Doxepin 3mg Tabs. PA submitted online thru Covermymeds. Waiting on authorization.

## 2024-01-08 ENCOUNTER — TELEPHONE (OUTPATIENT)
Dept: FAMILY MEDICINE CLINIC | Facility: CLINIC | Age: 34
End: 2024-01-08
Payer: COMMERCIAL

## 2024-01-08 NOTE — TELEPHONE ENCOUNTER
Pharmacy Name: [facility] [place], [place] - [address]   - [phone]  - [phone]    What medication are you calling in regards to: Doxepin 3mg     What question does the pharmacy have: PRIOR AUTH WAS DENIED    ELISHA WOULD LIKE TO KNOW IF THERE IS ANYTHING ELSE THAT CAN BE DONE TO GET MEDICATION APPROVED

## 2024-01-08 NOTE — TELEPHONE ENCOUNTER
Caller: Su Dolan    Relationship: Self    Best call back number: [phone]    What form or medical record are you requesting: LETTER FOR EMOTIONAL SUPPORT ANIMAL    Who is requesting this form or medical record from you: [facility]    How would you like to receive the form or medical records (pick-up, mail, fax):

## 2024-12-13 ENCOUNTER — OFFICE VISIT (OUTPATIENT)
Dept: FAMILY MEDICINE CLINIC | Facility: CLINIC | Age: 34
End: 2024-12-13
Payer: COMMERCIAL

## 2024-12-13 VITALS
TEMPERATURE: 98.6 F | WEIGHT: 176 LBS | OXYGEN SATURATION: 99 % | HEART RATE: 82 BPM | BODY MASS INDEX: 26.67 KG/M2 | SYSTOLIC BLOOD PRESSURE: 128 MMHG | DIASTOLIC BLOOD PRESSURE: 90 MMHG | RESPIRATION RATE: 18 BRPM | HEIGHT: 68 IN

## 2024-12-13 DIAGNOSIS — E66.3 OVERWEIGHT WITH BODY MASS INDEX (BMI) OF 25 TO 25.9 IN ADULT: ICD-10-CM

## 2024-12-13 DIAGNOSIS — R35.0 URINARY FREQUENCY: ICD-10-CM

## 2024-12-13 DIAGNOSIS — N30.90 CYSTITIS: Primary | ICD-10-CM

## 2024-12-13 LAB
B-HCG UR QL: NEGATIVE
BILIRUB BLD-MCNC: NEGATIVE MG/DL
CLARITY, POC: CLEAR
COLOR UR: YELLOW
EXPIRATION DATE: NORMAL
GLUCOSE UR STRIP-MCNC: NEGATIVE MG/DL
INTERNAL NEGATIVE CONTROL: NORMAL
INTERNAL POSITIVE CONTROL: NORMAL
KETONES UR QL: NEGATIVE
LEUKOCYTE EST, POC: ABNORMAL
Lab: NORMAL
NITRITE UR-MCNC: POSITIVE MG/ML
PH UR: 6 [PH] (ref 5–8)
PROT UR STRIP-MCNC: ABNORMAL MG/DL
RBC # UR STRIP: ABNORMAL /UL
SP GR UR: 1.02 (ref 1–1.03)
UROBILINOGEN UR QL: ABNORMAL

## 2024-12-13 PROCEDURE — 81002 URINALYSIS NONAUTO W/O SCOPE: CPT | Performed by: FAMILY MEDICINE

## 2024-12-13 PROCEDURE — 99213 OFFICE O/P EST LOW 20 MIN: CPT | Performed by: FAMILY MEDICINE

## 2024-12-13 PROCEDURE — 81025 URINE PREGNANCY TEST: CPT | Performed by: FAMILY MEDICINE

## 2024-12-13 RX ORDER — SULFAMETHOXAZOLE AND TRIMETHOPRIM 800; 160 MG/1; MG/1
1 TABLET ORAL 2 TIMES DAILY
Qty: 20 TABLET | Refills: 0 | Status: SHIPPED | OUTPATIENT
Start: 2024-12-13

## 2024-12-13 NOTE — PROGRESS NOTES
"Chief Complaint  Urinary Tract Infection    Subjective     CC  Problem List  Visit Diagnosis   Encounters  Notes  Medications  Labs  Result Review Imaging  Media    Su Dolan presents to [facility] for   Urinary Tract Infection   This is a new problem. The current episode started in the past 7 days. The problem has been unchanged. The quality of the pain is described as burning. The pain is at a severity of 0/10. The patient is experiencing no pain. There has been no fever. Associated symptoms include frequency, nausea and urgency. Pertinent negatives include no chills, discharge, flank pain, hematuria, hesitancy, possible pregnancy or vomiting. Associated symptoms comments: No fever. . She has tried nothing for the symptoms.     Review of Systems   Constitutional:  Negative for chills, fever and unexpected weight loss.   Respiratory:  Negative for shortness of breath.    Cardiovascular:  Negative for chest pain and palpitations.   Gastrointestinal:  Positive for nausea. Negative for abdominal pain, diarrhea and vomiting.   Endocrine: Negative for cold intolerance, heat intolerance, polydipsia and polyuria.   Genitourinary:  Positive for dysuria, frequency and urgency. Negative for flank pain, hematuria and hesitancy.   Skin:  Negative for rash.   Hematological:  Negative for adenopathy. Does not bruise/bleed easily.        Objective   Vital Signs:   /90 (BP Location: Right arm, Patient Position: Sitting, Cuff Size: Adult)   Pulse 82   Temp 98.6 °F (37 °C) (Temporal)   Resp 18   Ht 172.7 cm (67.99\")   Wt 79.8 kg (176 lb)   SpO2 99%   BMI 26.77 kg/m²     Physical Exam  Constitutional:       General: She is not in acute distress.  Cardiovascular:      Rate and Rhythm: Normal rate and regular rhythm.      Heart sounds: No murmur heard.  Pulmonary:      Effort: Pulmonary effort is normal.      Breath sounds: Normal breath sounds. No wheezing.   Abdominal: "      General: Abdomen is flat.      Palpations: Abdomen is soft. There is no mass.      Tenderness: There is no abdominal tenderness. There is no right CVA tenderness or left CVA tenderness.   Musculoskeletal:      Cervical back: Neck supple.   Lymphadenopathy:      Cervical: No cervical adenopathy.   Neurological:      Mental Status: She is alert.        Result Review :Labs  Result Review  Imaging  Med Tab  Media                 Assessment and Plan CC Problem List  Visit Diagnosis  ROS  Review (Popup)  Health Maintenance  Quality  BestPractice  Medications  SmartSets  SnapShot Encounters  Media  Problem List Items Addressed This Visit          Unprioritized    Overweight with body mass index (BMI) of 25 to 25.9 in adult    Current Assessment & Plan     Patient's (Body mass index is 26.77 kg/m².) indicates that they are overweight with health conditions that include none . Weight is unchanged. BMI is above average; BMI management plan is completed. We discussed portion control and increasing exercise.           Other Visit Diagnoses       Cystitis    -  Primary    Fuids abx cx notify of results, she will notify us of no improvement.    Urinary frequency        Relevant Medications    sulfamethoxazole-trimethoprim (BACTRIM DS,SEPTRA DS) 800-160 MG per tablet    Other Relevant Orders    POCT urinalysis dipstick, manual (Completed)    POCT pregnancy, urine (Completed)    Urine Culture - Urine, Urine, Random Void            Follow Up Instructions Charge Capture  Follow-up Communications  No follow-ups on file.  Patient was given instructions and counseling regarding her condition or for health maintenance advice. Please see specific information pulled into the AVS if appropriate.

## 2024-12-13 NOTE — ASSESSMENT & PLAN NOTE
Patient's (Body mass index is 26.77 kg/m².) indicates that they are overweight with health conditions that include none . Weight is unchanged. BMI is above average; BMI management plan is completed. We discussed portion control and increasing exercise.

## 2024-12-15 LAB
BACTERIA UR CULT: ABNORMAL
BACTERIA UR CULT: ABNORMAL

## 2024-12-17 DIAGNOSIS — N30.01 ACUTE CYSTITIS WITH HEMATURIA: Primary | ICD-10-CM

## 2024-12-17 LAB
BACTERIA UR CULT: ABNORMAL
BACTERIA UR CULT: ABNORMAL
OTHER ANTIBIOTIC SUSC ISLT: ABNORMAL

## 2024-12-17 RX ORDER — NITROFURANTOIN 25; 75 MG/1; MG/1
100 CAPSULE ORAL 2 TIMES DAILY
Qty: 14 CAPSULE | Refills: 0 | Status: SHIPPED | OUTPATIENT
Start: 2024-12-17

## 2025-02-05 NOTE — PROGRESS NOTES
Subjective   Su Dolan is a 34 y.o. female.   Chief Complaint   Patient presents with    Anxiety    Depression     History of Present Illness  Ongoing anxiety. Failed many SSRIs per pt report. Wellbutrin helping but causes anger. Doxepin not covered by insurance at 3 mg ER.  Failed klonopin/xanax as well        The following portions of the patient's history were reviewed and updated as appropriate: allergies, current medications, past family history, past medical history, past social history, past surgical history, and problem list.    Patient Active Problem List   Diagnosis    Anemia    Anxiety    Borderline personality disorder    Depression    Overweight with body mass index (BMI) of 25 to 25.9 in adult    Former tobacco use       Current Outpatient Medications on File Prior to Visit   Medication Sig Dispense Refill    Araceli Fe 1/20 1-20 MG-MCG per tablet Take 1 tablet by mouth Daily.      [DISCONTINUED] albuterol sulfate  (90 Base) MCG/ACT inhaler Inhale 2 puffs Every 4 (Four) Hours As Needed for Shortness of Air. 6.7 g 1    [DISCONTINUED] nitrofurantoin, macrocrystal-monohydrate, (MACROBID) 100 MG capsule Take 1 capsule by mouth 2 (Two) Times a Day. 14 capsule 0    [DISCONTINUED] sulfamethoxazole-trimethoprim (BACTRIM DS,SEPTRA DS) 800-160 MG per tablet Take 1 tablet by mouth 2 (Two) Times a Day. 20 tablet 0     No current facility-administered medications on file prior to visit.     Current outpatient and discharge medications have been reconciled for the patient.  Reviewed by: Tomas Cabrera MD      Allergies   Allergen Reactions    Wellbutrin [Bupropion] Irritability    Latex Rash       Review of Systems   Constitutional:  Positive for fatigue.   Psychiatric/Behavioral:  Positive for dysphoric mood and depressed mood.      I have reviewed and confirmed the accuracy of the ROS as documented by the MA/LPN/RN Tomas Cabrera MD    Objective   Visit Vitals  /80 (BP Location:  "Right arm, Patient Position: Sitting, Cuff Size: Large Adult)   Pulse 82   Resp 20   Ht 172.7 cm (67.99\")   Wt 82.1 kg (181 lb)   SpO2 99%   BMI 27.53 kg/m²      **  Physical Exam  Vitals reviewed.   Constitutional:       Appearance: She is well-developed.   HENT:      Head: Normocephalic.      Right Ear: External ear normal.      Left Ear: External ear normal.      Nose: Nose normal.   Eyes:      Conjunctiva/sclera: Conjunctivae normal.   Cardiovascular:      Rate and Rhythm: Normal rate.   Pulmonary:      Effort: Pulmonary effort is normal. No respiratory distress.   Abdominal:      General: Abdomen is flat.   Musculoskeletal:         General: No signs of injury.      Cervical back: Normal range of motion.   Skin:     Findings: No rash.   Neurological:      Mental Status: She is alert and oriented to person, place, and time.   Psychiatric:         Behavior: Behavior normal.         Thought Content: Thought content normal.         Judgment: Judgment normal.       Derm Physical Exam  Ortho Exam   Neurological Exam  Mental Status  Alert. Oriented to person, place, and time.         Assessment & Plan  Current severe episode of major depressive disorder without psychotic features, unspecified whether recurrent  Untreated. Start prestiq     Orders:    desvenlafaxine (PRISTIQ) 50 MG 24 hr tablet; Take 1 tablet by mouth Daily.    PHARMACOGENOMICS PROFILE, ACTX - Swab,; Future    Overweight with body mass index (BMI) of 27 to 27.9 in adult  Patient's (Body mass index is 27.53 kg/m².) indicates that they are overweight with health conditions that include none . Weight is unchanged. BMI is above average; BMI management plan is completed. We discussed portion control and increasing exercise.          Findings discussed. All questions answered.  Medication and medication adverse effects discussed.  Drug education given and explained to patient. Patient verbalized understanding.  Follow up in 1 month for reevaluation, sooner " for concerns.          Expected course, medications, and adverse effects discussed as appropriate.  Call or return if worsening or persistent symptoms.     This document is intended for medical professional use only.   Electronically signed by Tomas Cabrera MD on 02/06/2025. This content may not have been proofread.

## 2025-02-06 ENCOUNTER — OFFICE VISIT (OUTPATIENT)
Dept: FAMILY MEDICINE CLINIC | Facility: CLINIC | Age: 35
End: 2025-02-06
Payer: MEDICAID

## 2025-02-06 VITALS
DIASTOLIC BLOOD PRESSURE: 80 MMHG | RESPIRATION RATE: 20 BRPM | HEART RATE: 82 BPM | OXYGEN SATURATION: 99 % | BODY MASS INDEX: 27.43 KG/M2 | SYSTOLIC BLOOD PRESSURE: 122 MMHG | HEIGHT: 68 IN | WEIGHT: 181 LBS

## 2025-02-06 DIAGNOSIS — E66.3 OVERWEIGHT WITH BODY MASS INDEX (BMI) OF 27 TO 27.9 IN ADULT: ICD-10-CM

## 2025-02-06 DIAGNOSIS — F32.2 CURRENT SEVERE EPISODE OF MAJOR DEPRESSIVE DISORDER WITHOUT PSYCHOTIC FEATURES, UNSPECIFIED WHETHER RECURRENT: Primary | ICD-10-CM

## 2025-02-06 PROCEDURE — 1126F AMNT PAIN NOTED NONE PRSNT: CPT | Performed by: FAMILY MEDICINE

## 2025-02-06 PROCEDURE — 99214 OFFICE O/P EST MOD 30 MIN: CPT | Performed by: FAMILY MEDICINE

## 2025-02-06 RX ORDER — DESVENLAFAXINE 50 MG/1
50 TABLET, FILM COATED, EXTENDED RELEASE ORAL DAILY
Qty: 30 TABLET | Refills: 12 | Status: SHIPPED | OUTPATIENT
Start: 2025-02-06

## 2025-02-06 NOTE — ASSESSMENT & PLAN NOTE
Patient's (Body mass index is 27.53 kg/m².) indicates that they are overweight with health conditions that include none . Weight is unchanged. BMI is above average; BMI management plan is completed. We discussed portion control and increasing exercise.

## 2025-02-06 NOTE — ASSESSMENT & PLAN NOTE
Untreated. Start prestiq     Orders:    desvenlafaxine (PRISTIQ) 50 MG 24 hr tablet; Take 1 tablet by mouth Daily.    PHARMACOGENOMICS PROFILE, ACTX - Swab,; Future

## 2025-07-24 ENCOUNTER — TELEPHONE (OUTPATIENT)
Dept: FAMILY MEDICINE CLINIC | Facility: CLINIC | Age: 35
End: 2025-07-24
Payer: MEDICAID

## 2025-07-24 NOTE — TELEPHONE ENCOUNTER
Caller: Su Dolan    Relationship to patient: Self    Best call back number: [phone]     Chief complaint: SEIZURE 7/23/24    Patient directed to call 911 or go to their nearest emergency room.     Patient verbalized understanding: [x] Yes  [] No  If no, why?    Additional notes: